# Patient Record
Sex: FEMALE | Race: WHITE
[De-identification: names, ages, dates, MRNs, and addresses within clinical notes are randomized per-mention and may not be internally consistent; named-entity substitution may affect disease eponyms.]

---

## 2018-02-24 ENCOUNTER — HOSPITAL ENCOUNTER (EMERGENCY)
Dept: HOSPITAL 80 - FED | Age: 23
Discharge: HOME | End: 2018-02-24
Payer: COMMERCIAL

## 2018-02-24 VITALS
RESPIRATION RATE: 16 BRPM | OXYGEN SATURATION: 94 % | DIASTOLIC BLOOD PRESSURE: 74 MMHG | HEART RATE: 83 BPM | SYSTOLIC BLOOD PRESSURE: 119 MMHG

## 2018-02-24 VITALS — TEMPERATURE: 98.1 F

## 2018-02-24 DIAGNOSIS — J06.9: Primary | ICD-10-CM

## 2018-02-24 DIAGNOSIS — J45.909: ICD-10-CM

## 2018-02-24 NOTE — EDPHY
H & P


Time Seen by Provider: 02/24/18 10:55


HPI/ROS: 





CHIEF COMPLAINT:  Cough, myalgias





HISTORY OF PRESENT ILLNESS:  23-year-old male presents to the emergency 

department by private vehicle with cough and myalgias over last 2 days.  

Patient has a known history of asthma.  She has been using her albuterol 

inhaler as needed.  She is not smoke cigarettes.  No recent travel.  She was 

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 

influenza or other infection then.  She got flu shot in the fall.  She denies 

pain in her chest or difficulty breathing.  She has had cough, rhinorrhea, 

nasal congestion as well as headache.  She has had intermittent fevers and 

chills although does not own a thermometer.  No rash.  No headache.





REVIEW OF SYSTEMS:


Constitutional:  Subjective fevers, chills


Eyes:  No double or blurry vision.


ENT:  No sore throat.


Respiratory:  Cough.  No shortness of breath


Cardiac:  No chest pain.


Gastrointestinal:  No abdominal pain, vomiting or diarrhea.


Genitourinary:  No dysuria.


Musculoskeletal:  No neck or back pain.


Skin:  No rashes.


Neurological:   headache.


Past Medical/Surgical History: 





Asthma


Social History: 





Single, 


Smoking Status: Never smoked


Physical Exam: 


General Appearance:  Alert, no distress.  36.7 temperature.


Eyes:  Pupils equal and round.  Extraocular motions are all intact.


ENT:  Mouth:  Mucous membranes moist.


Respiratory:  No wheezing, rhonchi, or rales, lungs are clear to auscultation.  

No respiratory stress.


Cardiovascular:  Regular rate and rhythm.


Gastrointestinal:  Abdomen is soft and nontender, no masses, no rebound or 

guarding, bowel sounds normal.


Neurological:  Alert and oriented x 3, cranial nerves II through XII grossly 

intact


Skin:  Warm and dry, no rashes.


Musculoskeletal:  Nontender to palpate along the cervical, thoracic or lumbar 

spine.  Neck is supple.


Extremities:  Full range of motion and no peripheral edema.


Psychiatric:  Patient is oriented X 3, there is no agitation.


Constitutional: 


 Initial Vital Signs











Temperature (C)  36.7 C   02/24/18 10:47


 


Heart Rate  86   02/24/18 10:47


 


Respiratory Rate  18   02/24/18 10:47


 


Blood Pressure  116/81 H  02/24/18 10:47


 


O2 Sat (%)  96   02/24/18 10:47








 











O2 Delivery Mode               Room Air














Allergies/Adverse Reactions: 


 





No Known Allergies Allergy (Verified 02/24/18 10:46)


 








Home Medications: 














 Medication  Instructions  Recorded


 


NK [No Known Home Meds]  02/24/18














Medical Decision Making


ED Course/Re-evaluation: 





23-year-old female presents to the emergency department with cough, myalgias, 

and reported fever.  Influenza was negative.  I do not think chest x-rays 

indicated.  Do not think antibiotics are indicated.  The patient was reassured.

  She likely has viral illness.  I encouraged symptomatic or supportive care 

and she should return if she develops fever, difficulty breathing or any other 

concerns.


Differential Diagnosis: 





Including but not limited to influenza, bronchitis, pneumonia, viral upper 

respiratory infection





- Data Points


Laboratory Results: 


 











  02/24/18





  10:50


 


Nasal Influenza A PCR  NEGATIVE FOR FLU A 





   (NEGATIVE) 


 


Nasal Influenza B PCR  NEGATIVE FOR FLU B 





   (NEGATIVE) 














Departure





- Departure


Disposition: Home, Routine, Self-Care


Clinical Impression: 


Upper respiratory infection


Qualifiers:


 URI type: unspecified URI Qualified Code(s): J06.9 - Acute upper respiratory 

infection, unspecified





Condition: Good


Instructions:  Upper Respiratory Infection (ED)


Additional Instructions: 


Albuterol 2 puffs every 4 hr for 1 week and then as needed.





Adult Pain & Fever Control:


We recommend Acetaminophen (Tylenol) and Ibuprofen (Motrin,Advil) for pain and 

fever control.  When fever is high or pain severe, both drugs can be used at 

the same time, but at different intervals.  Please note the time differences.  


Your dose is:     Acetaminophen 1000mg every 4 to 6 hours


       Ibuprofen 600mg every 8 hours with food         


Note:  do not take Acetaminophen with Hydrocodone (Vicodin, Lortab) or Oycodone 

(Percocet).  These medications also contain Acetaminophen.  No more than 3000mg 

of Acetaminophen should be taken in 24 hours (for an adult).


Referrals: 


Servando Duggan MD [Medical Doctor] - 2-3 days without fail (Primary care 

provider on call)